# Patient Record
(demographics unavailable — no encounter records)

---

## 2025-05-15 NOTE — ASSESSMENT
[FreeTextEntry1] : The patient is a 96 yo man presenting with chronic left hip pain. He presents with his son. The patient is wheelchair bound. He has had chronic left groin pain of years. He is not walking at this time. He has pain with any rotation of the hip along with standing. He denies new trauma. He denies paresthesias. He has pain consistent with OA.   Left hip exam: The patient presents in no apparent distress. Neurovascularly intact. Sensation intact to left lower extremity. No scars, cuts or abrasions. 2+ pulses to posterior tibialis. ROM is decreased and significantly painful. + abductor tenderness. + groin pain. - lateral hip tenderness. - radiculopathy. - straight leg raise. 4/5 abductor strength. + pain with forced ER/IR.  Left hip xrays taken today in office, 1 view AP pelvis and two views hip, WB: Severe bone on bone OA of the left hip with significant left femoral head deformity. No fractures. No obvious tumors, masses, or lesions  The patient has severe bone on bone OA of the left hip with deformity. He will go for IA injection of the left hip. He is not a good surgical candidate.

## 2025-05-15 NOTE — HISTORY OF PRESENT ILLNESS
[Heat] : heat [] : yes [FreeTextEntry5] : 96 y/o M presents for NP eval today. Pt reports of pain due to OA. No prior tx. Use of patches with relief.  [FreeTextEntry7] : groin